# Patient Record
Sex: MALE | Race: WHITE | NOT HISPANIC OR LATINO | Employment: OTHER | URBAN - METROPOLITAN AREA
[De-identification: names, ages, dates, MRNs, and addresses within clinical notes are randomized per-mention and may not be internally consistent; named-entity substitution may affect disease eponyms.]

---

## 2020-01-14 ENCOUNTER — TRANSFERRED RECORDS (OUTPATIENT)
Dept: HEALTH INFORMATION MANAGEMENT | Facility: CLINIC | Age: 70
End: 2020-01-14

## 2020-07-29 ENCOUNTER — TRANSFERRED RECORDS (OUTPATIENT)
Dept: HEALTH INFORMATION MANAGEMENT | Facility: CLINIC | Age: 70
End: 2020-07-29

## 2020-08-06 ENCOUNTER — TRANSFERRED RECORDS (OUTPATIENT)
Dept: HEALTH INFORMATION MANAGEMENT | Facility: CLINIC | Age: 70
End: 2020-08-06

## 2020-08-13 ENCOUNTER — ANCILLARY PROCEDURE (OUTPATIENT)
Dept: CT IMAGING | Facility: CLINIC | Age: 70
End: 2020-08-13
Attending: NURSE PRACTITIONER
Payer: COMMERCIAL

## 2020-08-13 DIAGNOSIS — C61 PROSTATE CANCER (H): ICD-10-CM

## 2020-08-13 LAB
CREAT BLD-MCNC: 1 MG/DL (ref 0.66–1.25)
GFR SERPL CREATININE-BSD FRML MDRD: 74 ML/MIN/{1.73_M2}

## 2020-08-13 PROCEDURE — 74177 CT ABD & PELVIS W/CONTRAST: CPT | Performed by: RADIOLOGY

## 2020-08-13 RX ORDER — IOPAMIDOL 755 MG/ML
100 INJECTION, SOLUTION INTRAVASCULAR ONCE
Status: COMPLETED | OUTPATIENT
Start: 2020-08-13 | End: 2020-08-13

## 2020-08-13 RX ADMIN — IOPAMIDOL 97 ML: 755 INJECTION, SOLUTION INTRAVASCULAR at 14:56

## 2020-08-14 ENCOUNTER — ANCILLARY PROCEDURE (OUTPATIENT)
Dept: GENERAL RADIOLOGY | Facility: CLINIC | Age: 70
End: 2020-08-14
Attending: NURSE PRACTITIONER
Payer: COMMERCIAL

## 2020-08-14 ENCOUNTER — ANCILLARY PROCEDURE (OUTPATIENT)
Dept: NUCLEAR MEDICINE | Facility: CLINIC | Age: 70
End: 2020-08-14
Attending: NURSE PRACTITIONER
Payer: COMMERCIAL

## 2020-08-14 DIAGNOSIS — Z01.89 ENCOUNTER FOR OTHER SPECIFIED SPECIAL EXAMINATIONS: ICD-10-CM

## 2020-08-14 DIAGNOSIS — D07.5 CARCINOMA IN SITU OF PROSTATE: ICD-10-CM

## 2020-08-14 PROCEDURE — A9503 TC99M MEDRONATE: HCPCS

## 2020-08-14 PROCEDURE — 78306 BONE IMAGING WHOLE BODY: CPT

## 2020-08-14 PROCEDURE — 71046 X-RAY EXAM CHEST 2 VIEWS: CPT | Performed by: RADIOLOGY

## 2020-08-14 RX ORDER — TC 99M MEDRONATE 20 MG/10ML
20-30 INJECTION, POWDER, LYOPHILIZED, FOR SOLUTION INTRAVENOUS ONCE
Status: COMPLETED | OUTPATIENT
Start: 2020-08-14 | End: 2020-08-14

## 2020-08-14 RX ADMIN — TC 99M MEDRONATE 26.2 MCI.: 20 INJECTION, POWDER, LYOPHILIZED, FOR SOLUTION INTRAVENOUS at 09:30

## 2020-08-19 ENCOUNTER — TRANSFERRED RECORDS (OUTPATIENT)
Dept: HEALTH INFORMATION MANAGEMENT | Facility: CLINIC | Age: 70
End: 2020-08-19

## 2020-09-15 ENCOUNTER — VIRTUAL VISIT (OUTPATIENT)
Dept: RADIATION THERAPY | Facility: OUTPATIENT CENTER | Age: 70
End: 2020-09-15
Payer: COMMERCIAL

## 2020-09-15 DIAGNOSIS — C61 PROSTATE CANCER (H): Primary | ICD-10-CM

## 2020-09-15 NOTE — LETTER
9/15/2020         RE: Jasiel Rowland  [address]  [address]  [facility] [address]        Dear Colleague,    Thank you for referring your patient, Jasiel Rowland, to the [facility]. Please see a copy of my visit note below.       Department of Radiation Oncology  Radiation Therapy Center  [facility]  [address], [address]  [place] [address]  [phone]       Consultation Note    Name: Jasiel Rowland MRN: [id number]   : 1950   Date of Service: 9/15/2020  [facility]     Reason for consultation: Adenocarcinoma of the prostate, high risk, Medardo score 4+4 = 8, clinical T1 cN0 M0, PSA= 7.1.  Evaluate potential role of radiation therapy.    History of Present Illness   Mr. Rowland is a 70 year old male with a diagnosis of adenocarcinoma of the prostate, high risk, Medardo score 4+4 = 8, clinical T1 cN0 M0, PSA= 7.1.  Evaluate potential role of radiation therapy.    The patient underwent a PSA on 2020 which was elevated 7.1.  He eventually underwent prostate biopsy on 2020.  Pathology demonstrated prostate adenocarcinoma. one core on the right side was present with Medardo score 4+4 = 8 and one core on the right side demonstrated Model score 3+4 = 7.  Patient underwent CT scan of the abdomen pelvis on 2020 which was negative for pelvic lymphadenopathy.  Bone scan on 2020 was negative for metastatic osseous disease.  Patient will meet with Dr. Kaba next week to discuss surgical options.  Patient presents today to discuss potential role of radiation therapy as a part of treatment strategy.    He is overall doing well.  He denies significant urinary obstructive symptoms.  No prior radiation.  No history inflammatory bowel disease.  No pacemaker.  Up-to-date on colonoscopy.  ?  History of MI 10 years ago.  No diabetes. No stroke history.    Past Medical History:   No past medical history on file.    Past Surgical History:   No past  surgical history on file.    Chemotherapy History:  None    Radiation History:  None    Pregnant: Not Applicable  Implanted Cardiac Devices: No    Medications:  Current Outpatient Medications   Medication     ALBUTEROL SULFATE  (90 BASE) MCG/ACT IN AERS     aspirin 81 MG tablet     budesonide-formoterol (SYMBICORT) 160-4.5 MCG/ACT inhaler     Cyanocobalamin (VITAMIN B 12 PO)     hydrocortisone 2.5 % cream     IBUPROFEN PO     RANITIDINE HCL PO     SILDENAFIL CITRATE PO     No current facility-administered medications for this visit.          Allergies:   No Known Allergies      Family History:  No family history on file.    Review of Systems   A 10-point review of systems was performed. Pertinent findings are noted in the HPI.      Imaging/Path/Labs   Imagin/13/20: CTAP negative      20: Bone scan      Path:         Labs:   2020: PSA = 7.1    Assessment    Mr. Rowland is a 70 year old male with a diagnosis of adenocarcinoma of the prostate, high risk, [place] score 4+4 = 8, clinical T1 cN0 M0, PSA= 7.1.  Evaluate potential role of radiation therapy.    Plan     1.  I would like to have the pathology of the patient's prostate biopsy reviewed as the patient has only a single core of Medardo 8 disease in context of single core of low volume [place] 3+4=7 disease. I will also order MRI to clarify local extent of disease.    2. In the case the patient otherwise as high risk prostate cancer, we discussed the natural history of high risk prostate cancer.      3. I discussed prognosis and rationale for treatment of patients with high risk prostate cancer.  Given high risk disease, observation is only appropriate in patients with limited performance status.  Definitive options include prostatectomy and radiation therapy with hormonal therapy.  I discussed that both radiation therapy and androgen deprivation therapy are necessary.  Multiple randomized studies have demonstrated improved survival with the  additional of long term androgen deprivation therapy to radiation therapy, including RTOG 9202 and EORTC 45732 study.  On the RTOG 9202 study, there was a 13% absolute improvement in overall survival (45% vs 32%) at 10 years for the GS 8-10 subset.  In the EORTC study, at 5 years follow up, longer hormonal therapy resulted in a 40% reduction in risk of death.  Additionally, two large scale randomized studies, the SPCG7 and [facility] PR3 have demonstrated improve disease specific survival and overall survival with the addition of prostate radiation to androgen deprivation therapy alone.  In both trials, the addition of radiation led to an approximately 50% improvement in prostate cancer specific mortality at 6-10 years, increasing from 23.9% to 11.9% (absolute different of 12%) at 10 years on the SPCG7 trial.       4. We discussed radiation side effects of treatment including fatigue, urinary obstructive symptoms, loose stool, proctitis, cystitis, and erectile dysfunction.      5.  We discussed that we would recommend hypofractionated (70.2 Gy with SIB to nodes to 46 Gy in 26 fractions) RT vs conventional fractionation. I discussed I would likely include the prostate gland and the pelvic ozzie basins. We discussed treatment with long bhavana term -ADT (1.5 - 3 years) as well.     6. We discussed consideration of Space OAR prior to initiation of RT.      7. The patient will meet with Dr. Kaba next week to discuss surgical options.     8. We will have the patient RTC in 2 weeks after pathology review and MRI to discuss next steps in management.     Due to the concerns around COVID-19 and adhering to social distancing we conduct this visit over the telephone. Telephone call lasted 45 minutes.       Ky Martínez MD  Department of Radiation Oncology  [facility]

## 2020-09-15 NOTE — NURSING NOTE
"Jasiel Rowland is a 70 year old male who is being evaluated via a billable telephone visit.      The patient has been notified of following:     \"This telephone visit will be conducted via a call between you and your physician/provider. We have found that certain health care needs can be provided without the need for a physical exam.  This service lets us provide the care you need with a short phone conversation.  If a prescription is necessary we can send it directly to your pharmacy.  If lab work is needed we can place an order for that and you can then stop by our lab to have the test done at a later time.    Telephone visits are billed at different rates depending on your insurance coverage. During this emergency period, for some insurers they may be billed the same as an in-person visit.  Please reach out to your insurance provider with any questions.    If during the course of the call the physician/provider feels a telephone visit is not appropriate, you will not be charged for this service.\"    Patient has given verbal consent for Telephone visit?  Yes    What phone number would you like to be contacted at? cell    How would you like to obtain your AVS? Mail a copy    Phone call duration: 25 minutes RN time    Flora Ramirez RN    REASON FOR APPOINTMENT   Type of Cancer: prostate cancer  Date of Symptom Onset: rising PSA level - following at [facility]. Bx completed 7/29/20 was positive for prostate cancer    TREATMENT TO-DATE FOR THIS CANCER  Surgery ? Will talk with Dr. Kaba next week 9/22/20 to discuss   Chemotherapy ? Not offered   Other Treatments for this Cancer ? Radiation, eligard (?), spaceOAR    PERSONAL HISTORY OF CANCER   Previous Cancer ? no   Prior Radiation ? no   Prior Chemotherapy ? no   Prior Hormonal Therapy ? no     RECENT IMAGING STUDIES  CT scan 8/13/20 - [facility]  bone scan  8/13/20-[facility]    REFERRALS NEEDED  None at this time  (SpaceOAR with Dr. Areavlo pending patient's decision and " MRI scan)    VITALS  There were no vitals taken for this visit.    PACEMAKER/IMPLANTED CARDIAC DEVICE no    PAIN  Denies    PSYCHOSOCIAL  Marital Status:   Patient lives in [place]  with wife, Kaylee.  Number of children: 4  Working status: retired  Do you feel safe in your home? Yes    REVIEW OF SYSTEMS  Skin: negative  Eyes: glasses  Ears/Nose/Throat: hearing loss  Respiratory: No shortness of breath, dyspnea on exertion, cough, or hemoptysis  Cardiovascular: negative  Gastrointestinal: negative  Genitourinary: negative  Musculoskeletal: arthritis  Neurologic: negative  Psychiatric: negative  Hematologic/Lymphatic/Immunologic: negative  Endocrine: negative    Radiation Oncology Patient Teaching    Current Concern: patient will meet with URology surgeon, Dr. Kaba on 9/22/20. Today talked through the option of radiation (spaceOAR, Eligard) as an option for his newly diagnosed prostate cancer.    Person involved with teaching: Patient and Wife  Patient asked Questions: Yes  Patient was cooperative: Yes  Patient was receptive (willing to accept information given): Yes    Education Assessment  Comprehension ability: Medium  Knowledge level: Medium  Factors affecting teaching: None    Education Materials Given  None today due to phone consult    Educational Topics Discussed  Side effects, Medications, Activity, Nutrition, Adjustment to illness and When to call MD/RN    Response To Teaching  More review necessary  Do you have an advanced directive or living will? yes  Are you DNR/DNI? no

## 2020-09-15 NOTE — PROGRESS NOTES
Department of Radiation Oncology  Radiation Therapy Center  [facility]  [address], [address]  [place] [address]  [phone]       Consultation Note    Name: Jasiel Rowland MRN: [id number]   : 1950   Date of Service: 9/15/2020  [facility]     Reason for consultation: Adenocarcinoma of the prostate, high risk, [place] score 4+4 = 8, clinical T1 cN0 M0, PSA= 7.1.  Evaluate potential role of radiation therapy.    History of Present Illness   Mr. Rowland is a 70 year old male with a diagnosis of adenocarcinoma of the prostate, high risk, Medardo score 4+4 = 8, clinical T1 cN0 M0, PSA= 7.1.  Evaluate potential role of radiation therapy.    The patient underwent a PSA on 2020 which was elevated 7.1.  He eventually underwent prostate biopsy on 2020.  Pathology demonstrated prostate adenocarcinoma. one core on the right side was present with [place] score 4+4 = 8 and one core on the right side demonstrated Medardo score 3+4 = 7.  Patient underwent CT scan of the abdomen pelvis on 2020 which was negative for pelvic lymphadenopathy.  Bone scan on 2020 was negative for metastatic osseous disease.  Patient will meet with Dr. Kaba next week to discuss surgical options.  Patient presents today to discuss potential role of radiation therapy as a part of treatment strategy.    He is overall doing well.  He denies significant urinary obstructive symptoms.  No prior radiation.  No history inflammatory bowel disease.  No pacemaker.  Up-to-date on colonoscopy.  ?  History of MI 10 years ago.  No diabetes. No stroke history.    Past Medical History:   No past medical history on file.    Past Surgical History:   No past surgical history on file.    Chemotherapy History:  None    Radiation History:  None    Pregnant: Not Applicable  Implanted Cardiac Devices: No    Medications:  Current Outpatient Medications   Medication     ALBUTEROL SULFATE  (90 BASE) MCG/ACT  IN AERS     aspirin 81 MG tablet     budesonide-formoterol (SYMBICORT) 160-4.5 MCG/ACT inhaler     Cyanocobalamin (VITAMIN B 12 PO)     hydrocortisone 2.5 % cream     IBUPROFEN PO     RANITIDINE HCL PO     SILDENAFIL CITRATE PO     No current facility-administered medications for this visit.          Allergies:   No Known Allergies      Family History:  No family history on file.    Review of Systems   A 10-point review of systems was performed. Pertinent findings are noted in the HPI.      Imaging/Path/Labs   Imagin/13/20: CTAP negative      20: Bone scan      Path:         Labs:   2020: PSA = 7.1    Assessment    Mr. Rowland is a 70 year old male with a diagnosis of adenocarcinoma of the prostate, high risk, Rap[place] score 4+4 = 8, clinical T1 cN0 M0, PSA= 7.1.  Evaluate potential role of radiation therapy.    Plan     1.  I would like to have the pathology of the patient's prostate biopsy reviewed as the patient has only a single core of Rapidan 8 disease in context of single core of low volume Rapidan 3+4=7 disease. I will also order MRI to clarify local extent of disease.    2. In the case the patient otherwise as high risk prostate cancer, we discussed the natural history of high risk prostate cancer.      3. I discussed prognosis and rationale for treatment of patients with high risk prostate cancer.  Given high risk disease, observation is only appropriate in patients with limited performance status.  Definitive options include prostatectomy and radiation therapy with hormonal therapy.  I discussed that both radiation therapy and androgen deprivation therapy are necessary.  Multiple randomized studies have demonstrated improved survival with the additional of long term androgen deprivation therapy to radiation therapy, including RTOG 9202 and EORTC 42535 study.  On the RTOG 9202 study, there was a 13% absolute improvement in overall survival (45% vs 32%) at 10 years for the GS 8-10 subset.  In  the EORTC study, at 5 years follow up, longer hormonal therapy resulted in a 40% reduction in risk of death.  Additionally, two large scale randomized studies, the SPCG7 and [facility] PR3 have demonstrated improve disease specific survival and overall survival with the addition of prostate radiation to androgen deprivation therapy alone.  In both trials, the addition of radiation led to an approximately 50% improvement in prostate cancer specific mortality at 6-10 years, increasing from 23.9% to 11.9% (absolute different of 12%) at 10 years on the SPCG7 trial.       4. We discussed radiation side effects of treatment including fatigue, urinary obstructive symptoms, loose stool, proctitis, cystitis, and erectile dysfunction.      5.  We discussed that we would recommend hypofractionated (70.2 Gy with SIB to nodes to 46 Gy in 26 fractions) RT vs conventional fractionation. I discussed I would likely include the prostate gland and the pelvic ozzie basins. We discussed treatment with long bhavana term -ADT (1.5 - 3 years) as well.     6. We discussed consideration of Space OAR prior to initiation of RT.      7. The patient will meet with Dr. Kaba next week to discuss surgical options.     8. We will have the patient RTC in 2 weeks after pathology review and MRI to discuss next steps in management.     Due to the concerns around COVID-19 and adhering to social distancing we conduct this visit over the telephone. Telephone call lasted 45 minutes.       Ky Martínez MD  Department of Radiation Oncology  [facility]

## 2020-09-17 PROCEDURE — 00000346 ZZHCL STATISTIC REVIEW OUTSIDE SLIDES TC 88321: Performed by: RADIOLOGY

## 2020-09-18 ENCOUNTER — HOSPITAL ENCOUNTER (OUTPATIENT)
Dept: MRI IMAGING | Facility: CLINIC | Age: 70
Discharge: HOME | End: 2020-09-18
Attending: RADIOLOGY | Admitting: RADIOLOGY
Payer: COMMERCIAL

## 2020-09-18 DIAGNOSIS — C61 PROSTATE CANCER (H): ICD-10-CM

## 2020-09-18 LAB
CREAT BLD-MCNC: 1 MG/DL (ref 0.66–1.25)
GFR SERPL CREATININE-BSD FRML MDRD: 74 ML/MIN/{1.73_M2}

## 2020-09-18 PROCEDURE — 25500064 ZZH RX 255 OP 636: Performed by: RADIOLOGY

## 2020-09-18 PROCEDURE — 72197 MRI PELVIS W/O & W/DYE: CPT

## 2020-09-18 PROCEDURE — 82565 ASSAY OF CREATININE: CPT

## 2020-09-18 PROCEDURE — A9585 GADOBUTROL INJECTION: HCPCS | Performed by: RADIOLOGY

## 2020-09-18 RX ORDER — GADOBUTROL 604.72 MG/ML
10 INJECTION INTRAVENOUS ONCE
Status: COMPLETED | OUTPATIENT
Start: 2020-09-18 | End: 2020-09-18

## 2020-09-18 RX ADMIN — GADOBUTROL 7 ML: 604.72 INJECTION INTRAVENOUS at 18:33

## 2020-09-19 LAB — COPATH REPORT: NORMAL

## 2020-09-21 ENCOUNTER — TELEPHONE (OUTPATIENT)
Facility: CLINIC | Age: 70
End: 2020-09-21

## 2020-09-21 NOTE — TELEPHONE ENCOUNTER
Called imaging to have MRI from 9/18/20 pushed to [facility]. Pt has consult with urology there today.

## 2020-09-22 ENCOUNTER — TELEPHONE (OUTPATIENT)
Dept: RADIATION THERAPY | Facility: OUTPATIENT CENTER | Age: 70
End: 2020-09-22

## 2020-09-22 NOTE — TELEPHONE ENCOUNTER
Pt met with urology yesterday. He will be having surgery/prostatectomy. He called to cancel follow up in radiation on 10/6/20 he was previously trying to decide between treatment options.  MD updated. Pt can be prn in radiation.

## 2020-12-07 ENCOUNTER — TRANSFERRED RECORDS (OUTPATIENT)
Dept: HEALTH INFORMATION MANAGEMENT | Facility: CLINIC | Age: 70
End: 2020-12-07

## 2021-06-17 ENCOUNTER — TRANSFERRED RECORDS (OUTPATIENT)
Dept: HEALTH INFORMATION MANAGEMENT | Facility: CLINIC | Age: 71
End: 2021-06-17

## 2021-07-21 ENCOUNTER — TRANSCRIBE ORDERS (OUTPATIENT)
Dept: OTHER | Age: 71
End: 2021-07-21

## 2021-07-21 DIAGNOSIS — M48.061 LUMBAR SPINAL STENOSIS: Primary | ICD-10-CM

## 2021-07-22 ENCOUNTER — TELEPHONE (OUTPATIENT)
Dept: PALLIATIVE MEDICINE | Facility: CLINIC | Age: 71
End: 2021-07-22

## 2021-07-22 NOTE — TELEPHONE ENCOUNTER
Screening Questions for Radiology Injections:    Injection to be done at which interventional clinic site? [facility]    If [facility] location, tell patient that this procedure requires a COVID-19 lab test be done within 4 days of the procedure. Would you still like to move forward with scheduling the procedure?  Not Applicable   If YES, let patient know that someone will call them to schedule the COVID-19 test and that they will only receive a call back if the result is positive. Route to nursing to enter order.     Instruct patient to arrive as directed prior to the scheduled appointment time:    [place] minutes before      [place]: 30 minutes before; if IV needed 1 hour before     [facility] by [facility]    Procedure ordered? LESI      Transforaminal Cervical LIN - no pain provider currently performing    As a reminder, receiving steroids can decrease your body's ability to fight infection.   Would you still like to move forward with scheduling the injection?  Yes    What insurance would patient like us to bill for this procedure? [facility]/ [facility]      Worker's comp or MVA (motor vehicle accident) -Any injection DO NOT SCHEDULE and route to Queta Ramos.      [employer] insurance - For SI joint injections, DO NOT SCHEDULE and route Queta Ramos.       ALL BCBS, [employer] and [employer]-Route to Queta for review DO NOT SCHEDULE      IF SCHEDULING IN [place] AND NEEDS A PA, IT IS OKAY TO SCHEDULE. [place] HANDLES THEIR OWN PA'S AFTER THE PATIENT IS SCHEDULED. PLEASE SCHEDULE AT LEAST 1 WEEK OUT SO A PA CAN BE OBTAINED.    Any chance of pregnancy? Not Applicable   If YES, do NOT schedule and route to RN pool    Is an  needed? No     Patient has a drive home? (mandatory) YES: INFORMED    Is patient taking any blood thinners (i.e. plavix, coumadin, jantoven, warfarin, heparin, pradaxa or dabigatran, etc)? No   If hold needed, do NOT schedule, route to RN pool      Is patient taking any aspirin products (includes Excedrin and Fiorinal)? No     If more than 325mg/day, OK to schedule; Instruct pt to decrease to less than 325 mg for 7 days AND route to RN pool    For CERVICAL procedures, hold all aspirin products for 6 days.     Tell pt that if aspirin product is not held for 6 days, the procedure WILL BE cancelled.      Does the patient have a bleeding or clotting disorder? No     If YES, okay to schedule AND route to RN nurse pool    For any patients with platelet count <100, must be forwarded to provider    Any allergies to contrast dye, iodine, shellfish, or numbing and steroid medications? No    If YES, add allergy information to appointment notes AND route to the RN pool     If LIN and Contrast Dye / Iodine Allergy? DO NOT SCHEDULE, route to RN pool    Allergies: Patient has no known allergies.     Is patient diabetic?  No  If YES, instruct them to bring their glucometer.    Does patient have an active infection or treated for one within the past week? No     Is patient currently taking any antibiotics?  No     For patients on chronic, preventative, or prophylactic antibiotics, procedures may be scheduled.     For patients on antibiotics for active or recent infection:antibiotic course must have been completed for 4 days    Is patient currently taking any steroid medications? (i.e. Prednisone, Medrol)  No     For patients on steroid medications, course must have been completed for 4 days    Is patient actively being treated for cancer or immunocompromised? No  If YES, do NOT schedule and route to RN pool     Are you able to get on and off an exam table with minimal or no assistance? Yes  If NO, do NOT schedule and route to RN pool    Are you able to roll over and lay on your stomach with minimal or no assistance? Yes  If NO, do NOT schedule and route to RN pool     Has the patient had a flu shot or any other vaccinations within 7 days before or after the procedure.  No      Have you recently had a COVID vaccine or have plans to get it in the near future? No    If yes, explain that for the vaccine to work best they need to:       wait 1 week before and 1 week after getting Vaccine #1    wait 1 week before and 2 weeks after getting Vaccine #2    If patient has concerns about the timing, send to RN pool     Does patient have an MRI/CT?  YES:   Check Procedure Scheduling Grid to see if required.      Was the MRI done within the last 3 years?  Yes    If yes, where was the MRI done i.e.[facility], [facility], [facility], [facility] etc? [facility]      If no, do not schedule and route to RN pool    If MRI was not done at [facility], [facility] or [facility] do NOT schedule and route to RN pool.      If pt has an imaging disc, the injection MAY be scheduled but pt has to bring disc to appt.     If they show up without the disc the injection cannot be done    Procedure Specific Instructions:      If celiac plexus block, informed patient NPO for 6 hours and that it is okay to take medications with sips of water, especially blood pressure medications  Not Applicable         If this is for a cervical procedure, informed patient that aspirin needs to be held for 6 days.   Not Applicable      If IV needed:    Do not schedule procedures requiring IV placement in the first appointment of the day or first appointment after lunch. Do NOT schedule at 0745, 0815 or 1245.     Instructed pt to arrive 30 minutes early for IV start if required. (Check Procedure Scheduling Grid)  Not Applicable    Reminders:      If you are started on any steroids or antibiotics between now and your appointment, you must contact us because the procedure may need to be cancelled.  Yes      For all procedures except radiofrequency ablations (RFAs) and spinal cord stimulator (SCS) trials, informed patient:    IV sedation is not provided for this procedure.  If you feel that an oral anti-anxiety medication is needed, you can discuss  this further with your referring provider or primary care provider.  The Pain Clinic provider will discuss specifics of what the procedure includes at your appointment.  Most procedures last 10-20 minutes.  We use numbing medications to help with any discomfort during the procedure.  Not Applicable      For patients 85 or older we recommend having an adult stay w/ them for the remainder of the day.       Does the patient have any questions?  NO  Jennifer Powers  [facility]

## 2021-07-23 NOTE — TELEPHONE ENCOUNTER
[facility] calling to see if patient is schedule yet. Informed we needed imaging before we could proceed. They stated they would proactively mail a copy of the MRI to [facility] in case we can't find imaging in PACS.    Jennifer FARLEY    [facility]

## 2021-07-28 NOTE — TELEPHONE ENCOUNTER
Pt scheduled 7/30 in [place]. Imaging disc was received in the mail at [place] site.    Jennifer FARLEY    [facility]

## 2021-07-28 NOTE — TELEPHONE ENCOUNTER
Received imaging and letter from [facility]. Placed in un argent bin by the MA desk.    Cesar Jha MA

## 2021-07-30 ENCOUNTER — RADIOLOGY INJECTION OFFICE VISIT (OUTPATIENT)
Dept: PALLIATIVE MEDICINE | Facility: CLINIC | Age: 71
End: 2021-07-30
Payer: COMMERCIAL

## 2021-07-30 VITALS
HEART RATE: 61 BPM | SYSTOLIC BLOOD PRESSURE: 136 MMHG | OXYGEN SATURATION: 98 % | DIASTOLIC BLOOD PRESSURE: 88 MMHG | RESPIRATION RATE: 16 BRPM

## 2021-07-30 DIAGNOSIS — M54.16 LUMBAR RADICULOPATHY: ICD-10-CM

## 2021-07-30 DIAGNOSIS — M48.061 LUMBAR SPINAL STENOSIS: ICD-10-CM

## 2021-07-30 PROCEDURE — 64484 NJX AA&/STRD TFRM EPI L/S EA: CPT | Mod: RT | Performed by: PAIN MEDICINE

## 2021-07-30 PROCEDURE — 64483 NJX AA&/STRD TFRM EPI L/S 1: CPT | Mod: RT | Performed by: PAIN MEDICINE

## 2021-07-30 RX ORDER — DEXAMETHASONE SODIUM PHOSPHATE 10 MG/ML
10 INJECTION INTRAMUSCULAR; INTRAVENOUS ONCE
Status: COMPLETED | OUTPATIENT
Start: 2021-07-30 | End: 2021-07-30

## 2021-07-30 RX ORDER — NAPROXEN 500 MG/1
TABLET ORAL
COMMUNITY
Start: 2021-06-02

## 2021-07-30 RX ADMIN — DEXAMETHASONE SODIUM PHOSPHATE 10 MG: 10 INJECTION INTRAMUSCULAR; INTRAVENOUS at 11:02

## 2021-07-30 ASSESSMENT — PAIN SCALES - GENERAL: PAINLEVEL: MODERATE PAIN (4)

## 2021-07-30 NOTE — PATIENT INSTRUCTIONS
[facility]   Procedure Discharge Instructions    Today you saw:    Dr. John Lui      You had an:  Lumbar Epidural steroid injection       Medications used:  Lidocaine   Bupivacaine   Dexamethasone Omnipaque                If you were holding your blood thinning medication, please restart taking it: N/A    Be cautious when walking. Numbness and/or weakness in the lower extremities may occur for up to 6-8 hours after the procedure due to effect of the local anesthetic    Do not drive for 6 hours. The effect of the local anesthetic could slow your reflexes.     You may resume your regular activities after 24 hours    Avoid strenuous activity for the first 24 hours    You may shower, however avoid swimming, tub baths or hot tubs for 24 hours following your procedure    You may have a mild to moderate increase in pain for several days following the injection.    It may take up to 14 days for the steroid medication to start working although you may feel the effect as early as a few days after the procedure.       You may use ice packs for 10-15 minutes, 3 to 4 times a day at the injection site for comfort    Do not use heat to painful areas for 6 to 8 hours. This will give the local anesthetic time to wear off and prevent you from accidentally burning your skin.     Unless you have been directed to avoid the use of anti-inflammatory medications (NSAIDS), you may use medications such as ibuprofen, Aleve or Tylenol for pain control if needed.     Possible side effects of steroids that you may experience include flushing, elevated blood pressure, increased appetite, mild headaches and restlessness.  All of these symptoms will get better with time.    If you experience any of the following, call the Pain Clinic during work hours (Mon-Friday 8-4:30 pm) at [phone] or the Provider Line after hours at [phone]:  -Fever over 100 degree F  -Swelling, bleeding, redness, drainage, warmth  at the injection site  -Progressive weakness or numbness in your legs   -Loss of bowel or bladder function  -Unusual new onset of pain that is not improving

## 2021-07-30 NOTE — NURSING NOTE
Discharge Information    IV Discontiued Time:  NA    Amount of Fluid Infused:  NA    Discharge Criteria = When patient returns to baseline or as per MD order    Consciousness:  Pt is fully awake    Circulation:  BP +/- 20% of pre-procedure level    Respiration:  Patient is able to breathe deeply    O2 Sat:  Patient is able to maintain O2 Sat >92% on room air    Activity:  Moves 4 extremities on command    Ambulation:  Patient is able to stand and walk or stand and pivot into wheelchair    Dressing:  Clean/dry or No Dressing    Notes:   Discharge instructions and AVS given to patient    Patient meets criteria for discharge?  YES    Admitted to PCU?  No    Responsible adult present to accompany patient home?  Yes    Signature/Title:    Chadd Powers RN  RN Care Coordinator  [facility] [place]

## 2021-07-30 NOTE — PROGRESS NOTES
Pre procedure Diagnosis: lumbar radiculopathy, lumbar degenerative disc disease   Post procedure Diagnosis: Same  Procedure performed: lumbar transforaminal epidural steroid injection at right L3-4 4-5, fluoroscopically guided, contrast controlled  Anesthesia: none  Complications: none  Operators: John Lui MD     Indications:   Jasiel Rowland is a 71 year old male.  They have a history of right-sided low back pain radiating to his anterior thigh.  Exam shows + slump and they have tried conservative treatment including meds/pt.    MRI severe right L3-4, 4-5 foraminal stenosis on the right side  Options/alternatives, benefits and risks were discussed with the patient including bleeding, infection, tissue trauma, numbness, weakness, paralysis, spinal cord injury, radiation exposure, headache and reaction to medications. Questions were answered to his satisfaction and he agrees to proceed. Voluntary informed consent was obtained and signed.     Vitals were reviewed: Yes  BP (!) 144/85   Pulse 67   Allergies were reviewed:  Yes   Medications were reviewed:  Yes   Pre-procedure pain score: 7/10    Procedure:  After getting informed consent, patient was brought into the procedure suite and was placed in a prone position on the procedure table.   A Pause for the Cause was performed.  Patient was prepped and draped in sterile fashion.     After identifying the right L3-4, 4-5 neuroforamen, the C-arm was rotated to a right lateral oblique angle.  A total of 4ml of Lidocaine 1% was used to anesthetize the skin and the needle track at a skin entry site coaxial with the fluoroscopy beam, and overriding the superior aspect of the neuroforamen.  A 22 gauge 3.5 inch spinal needle was advanced under intermittent fluoroscopy until it entered the foramen superiorly.    The position was then inspected from anteroposterior and lateral views, and the needle adjusted appropriately.  A total of 1ml of Omnipaque-300 was injected,  confirming appropriate position, with spread into the nerve root sheath and the epidural space, with no intravascular uptake. 9ml was wasted    Then, after repeated negative aspiration, a combination of Decadron 10 mg, 0.25% bupivacaine 2 ml, diluted with 3ml of normal saline was injected.     Hemostasis was achieved, the area was cleaned, and bandaids were placed when appropriate.  The patient tolerated the procedure well, and was taken to the recovery room.    Images were saved to PACS.    Post-procedure pain score: 0/10  Follow-up includes:   -f/u phone call in one week  -f/u with referring provider    John Lui MD  [facility] [place]

## 2022-01-06 ENCOUNTER — TRANSCRIBE ORDERS (OUTPATIENT)
Dept: OTHER | Age: 72
End: 2022-01-06

## 2022-01-06 DIAGNOSIS — M48.061 SPINAL STENOSIS, LUMBAR REGION, WITHOUT NEUROGENIC CLAUDICATION: Primary | ICD-10-CM

## 2022-01-26 ENCOUNTER — TELEPHONE (OUTPATIENT)
Dept: PALLIATIVE MEDICINE | Facility: CLINIC | Age: 72
End: 2022-01-26
Payer: MEDICARE

## 2022-01-26 NOTE — TELEPHONE ENCOUNTER
Screening Questions for Radiology Injections:    Injection to be done at which interventional clinic site? [facility]    Remind [place] Pt's that Covid test is no longer required except for sedation procedure Pt's.    Instruct patient to arrive as directed prior to the scheduled appointment time:    [facility] before      [place]: 30 minutes before; if IV needed 1 hour before     [facility] by [facility]    Procedure ordered? LESI      Transforaminal Cervical LIN -  [place] does NOT have providers that do these- [facility] and [facility] do have providers that do    As a reminder, receiving steroids can decrease your body's ability to fight infection.   Would you still like to move forward with scheduling the injection?  Yes    What insurance would patient like us to bill for this procedure? [facility]      Worker's comp or MVA (motor vehicle accident) -Any injection DO NOT SCHEDULE and route to Queta Ramos.      [facility] - For SI joint injections, DO NOT SCHEDULE and route Queta Ramos.       ALL BCBS, [employer] and [employer]-Route to Queta for review DO NOT SCHEDULE      IF SCHEDULING IN [place] AND NEEDS A PA, IT IS OKAY TO SCHEDULE. [place] HANDLES THEIR OWN PA'S AFTER THE PATIENT IS SCHEDULED. PLEASE SCHEDULE AT LEAST 1 WEEK OUT SO A PA CAN BE OBTAINED.    Any chance of pregnancy? Not Applicable   If YES, do NOT schedule and route to RN pool    Is an  needed? No     Patient has a drive home? (mandatory) YES: INFORMED    Is patient taking any blood thinners (That is: Coumadin, Warfarin, Jantoven, Pradaxa Xarelto, Eliquis, Edoxaban, Enoxaparin, Lovenox, Heparin, Arixtra, Fondaparinux, or Fragmin? OR Antiplatelet medication such as Plavix, Brilinta, or Effient? )? No   If hold needed, do NOT schedule, route to RN pool     Is patient taking any aspirin products (includes Excedrin and Fiorinal)? No     If more than 325mg/day, OK to schedule; Instruct pt to decrease  to less than 325 mg for 7 days AND route to RN pool    For CERVICAL procedures, hold all aspirin products for 6 days.     Tell pt that if aspirin product is not held for 6 days, the procedure WILL BE cancelled.      Does the patient have a bleeding or clotting disorder? No     If YES, okay to schedule AND route to RN nurse pool    For any patients with platelet count <100, must be forwarded to provider    Any allergies to contrast dye, iodine, shellfish, or numbing and steroid medications? No    If YES, add allergy information to appointment notes AND route to the RN pool     If LIN and Contrast Dye / Iodine Allergy? DO NOT SCHEDULE, route to RN pool    Allergies: Patient has no known allergies.     Is patient diabetic?  No  If YES, instruct them to bring their glucometer.    Does patient have an active infection or treated for one within the past week? No     Is patient currently taking any antibiotics?  No     For patients on chronic, preventative, or prophylactic antibiotics, procedures may be scheduled.     For patients on antibiotics for active or recent infection:antibiotic course must have been completed for 4 days    Is patient currently taking any steroid medications? (i.e. Prednisone, Medrol)  No     For patients on steroid medications, course must have been completed for 4 days    Is patient actively being treated for cancer or immunocompromised? No  If YES, do NOT schedule and route to RN pool     Are you able to get on and off an exam table with minimal or no assistance? Yes  If NO, do NOT schedule and route to RN pool    Are you able to roll over and lay on your stomach with minimal or no assistance? Yes  If NO, do NOT schedule and route to RN pool     Has the patient had a flu shot or any other vaccinations within 7 days before or after the procedure.  No     Have you recently had a COVID vaccine or have plans to get it in the near future? No    If yes, explain that for the vaccine to work best they  need to:       wait 1 week before and 1 week after getting Vaccine #1    wait 1 week before and 2 weeks after getting Vaccine #2    wait 1 week before and 2 weeks after getting Vaccine BOOSTER    If patient has concerns about the timing, send to RN pool     Does patient have an MRI/CT?  YES: 2021  Check Procedure Scheduling Grid to see if required.      Was the MRI done within the last 3 years?  Yes    If yes, where was the MRI done i.e.[facility], [facility], [facility], [facility] etc? [facility]      If no, do not schedule and route to RN pool    If MRI was not done at [facility], [facility] or [facility] do NOT schedule and route to RN pool.      If pt has an imaging disc, the injection MAY be scheduled but pt has to bring disc to appt.     If they show up without the disc the injection cannot be done    Procedure Specific Instructions:      If celiac plexus block, informed patient NPO for 6 hours and that it is okay to take medications with sips of water, especially blood pressure medications  Not Applicable         If this is for a cervical procedure, informed patient that aspirin needs to be held for 6 days.   Not Applicable      If IV needed:    Do not schedule procedures requiring IV placement in the first appointment of the day or first appointment after lunch. Do NOT schedule at 0745, 0815 or 1245.     Instructed pt to arrive 30 minutes early for IV start if required. (Check Procedure Scheduling Grid)  Not Applicable    Reminders:      If you are started on any steroids or antibiotics between now and your appointment, you must contact us because the procedure may need to be cancelled.  Yes      For all procedures except radiofrequency ablations (RFAs) and spinal cord stimulator (SCS) trials, informed patient:    IV sedation is not provided for this procedure.  If you feel that an oral anti-anxiety medication is needed, you can discuss this further with your referring provider or primary care provider.  The Pain  Clinic provider will discuss specifics of what the procedure includes at your appointment.  Most procedures last 10-20 minutes.  We use numbing medications to help with any discomfort during the procedure.  Not Applicable      For patients 85 or older we recommend having an adult stay w/ them for the remainder of the day.       Does the patient have any questions?  NO  Jennifer Powers  [facility]

## 2022-01-27 ENCOUNTER — RADIOLOGY INJECTION OFFICE VISIT (OUTPATIENT)
Dept: PALLIATIVE MEDICINE | Facility: CLINIC | Age: 72
End: 2022-01-27
Payer: COMMERCIAL

## 2022-01-27 VITALS — SYSTOLIC BLOOD PRESSURE: 143 MMHG | DIASTOLIC BLOOD PRESSURE: 79 MMHG | OXYGEN SATURATION: 99 % | HEART RATE: 61 BPM

## 2022-01-27 DIAGNOSIS — M48.061 SPINAL STENOSIS, LUMBAR REGION, WITHOUT NEUROGENIC CLAUDICATION: ICD-10-CM

## 2022-01-27 DIAGNOSIS — M54.16 LUMBAR RADICULOPATHY: ICD-10-CM

## 2022-01-27 PROCEDURE — 62323 NJX INTERLAMINAR LMBR/SAC: CPT | Performed by: PAIN MEDICINE

## 2022-01-27 RX ORDER — TRIAMCINOLONE ACETONIDE 40 MG/ML
40 INJECTION, SUSPENSION INTRA-ARTICULAR; INTRAMUSCULAR ONCE
Status: COMPLETED | OUTPATIENT
Start: 2022-01-27 | End: 2022-01-27

## 2022-01-27 RX ORDER — ROSUVASTATIN CALCIUM 40 MG/1
40 TABLET, COATED ORAL DAILY
COMMUNITY
Start: 2021-12-20

## 2022-01-27 RX ADMIN — TRIAMCINOLONE ACETONIDE 40 MG: 40 INJECTION, SUSPENSION INTRA-ARTICULAR; INTRAMUSCULAR at 13:05

## 2022-01-27 ASSESSMENT — PAIN SCALES - GENERAL: PAINLEVEL: MILD PAIN (3)

## 2022-01-27 NOTE — NURSING NOTE
Pre-procedure Intake  If YES to any questions or NO to having a   Please complete laminated checklist and leave on the computer keyboard for Provider, verbally inform provider if able.    For SCS Trial, RFA's or any sedation procedure:  Have you been fasting? NA    If yes, for how long?     Are you taking any any blood thinners such as Coumadin, Warfarin, Jantoven, Pradaxa Xarelto, Eliquis, Edoxaban, Enoxaparin, Lovenox, Heparin, Arixtra, Fondaparinux, or Fragmin? OR Antiplatelet medication such as Plavix, Brilinta, or Effient?   No     If yes, when did you take your last dose?    Do you take aspirin?  No    If cervical procedure, have you held aspirin for 6 days?       Do you have any allergies to contrast dye, iodine, steroid and/or numbing medications?  NO    Are you currently taking antibiotics or have an active infection?  NO    Have you had a fever/elevated temperature within the past week? NO    Are you currently taking oral steroids? NO    Do you have a ? Yes    Are you pregnant or breastfeeding?  Not Applicable    Have you received the COVID-19 vaccine? No     If yes, was it your 1st, 2nd or only dose needed?     Date of most recent vaccine:     Notify provider and RNs if systolic BP >170, diastolic BP >100, P >100 or O2 sats < 90%

## 2022-01-27 NOTE — NURSING NOTE
Discharge Information    IV Discontiued Time:  NA    Amount of Fluid Infused:  NA    Discharge Criteria = When patient returns to baseline or as per MD order    Consciousness:  Pt is fully awake    Circulation:  BP +/- 20% of pre-procedure level    Respiration:  Patient is able to breathe deeply    O2 Sat:  Patient is able to maintain O2 Sat >92% on room air    Activity:  Moves 4 extremities on command    Ambulation:  Patient is able to stand and walk or stand and pivot into wheelchair    Dressing:  Clean/dry or No Dressing    Notes:   Discharge instructions and AVS given to patient    Patient meets criteria for discharge?  YES    Admitted to PCU?  No    Responsible adult present to accompany patient home?  Yes    Signature/Title:    leighann orozco RN  RN Care Coordinator  [facility] [place]

## 2022-01-27 NOTE — PROGRESS NOTES
Pre procedure Diagnosis: Lumbar radiculopathy  Post procedure Diagnosis: Same  Procedure performed: Right L4-5 interlaminar epidural steroid injection   Anesthesia:  none  Complications: None  Operators: John Lui MD     Indications:   Jasiel Rowland is a 71 year old male.  The patient has a history of right-sided low back pain radiating to his anterior leg.  Examination shows positive slump.  he has tried conservative treatment including meds/PT/prior injections with benefit.    MRI reviewed   Options s/alternatives, benefits and risks were discussed with the patient including but not limited to bleeding, infection, no pain relief, tissue trauma, exposure to radiation, reaction to medications, spinal cord injury, dural puncture, weakness, numbness and headache.  Questions were answered to his satisfaction and he wishes to proceed. Voluntary informed consent was obtained and signed.     Vitals were reviewed: Yes  Allergies were reviewed:  Yes   Medications were reviewed:  Yes   Pre-procedure pain score: 3/10    Procedure:  The patient's medical history, medications, and allergies were reviewed and reconciled.  After obtaining signed informed consent, the patient was brought into the procedure suite and was placed in a prone position on the procedure table.   A Pause for the Cause was performed.  Patient was prepped and draped in the usual sterile fashion.     The L 4-5 interspace was identified with AP fluoroscopy.  A total of 1ml of 1% lidocaine was used to anesthetize the skin and subcutaneous tissues for a  midline approach.    A 20gauge 3.5inch Touhy needle was advanced utilizing intermittent AP and Lateral fluoroscopy and air for loss of resistance.  The epidural space was encountered on the first pass without difficulties.  Aspiration for blood and CSF was negative.  Needle position was verified by injecting 1 ml of Omnipaque utilizing real-time fluoroscopy that showed good needle placement and  epidural spread without signs of intravascular or intrathecal uptake.  Omnipaque wasted:  9 ml.    Then, after repeated negative aspiration for blood or CSF, a combination of Kenalog 40 mg, Bupivicaine 0.25% 2 ml, diluted with 3 ml of normal saline to a total injectate volume of 6 ml was injected into the epidural space in a slow and incremental fashion and the needle was restyletted and withdrawn.  All injected medications were preservative free.    The injection site was cleaned and a sterile dressing was applied.    The patient tolerated the procedure well without complications and was taken to the recovery room for continued observation.    Images were saved to PACS.    Post-procedure pain score: 3/10  Follow-up includes:   -f/u phone call in one week  -f/u with referring provider     John Lui MD  [facility] [place]

## 2022-01-27 NOTE — PATIENT INSTRUCTIONS
[facility]   Procedure Discharge Instructions    Today you saw:  Dr. John Lui      You had an:  Epidural steroid injection   -lumbar       Be cautious when walking. Numbness and/or weakness in the lower extremities may occur for up to 6-8 hours after the procedure due to effect of the local anesthetic    Do not drive for 6 hours. The effect of the local anesthetic could slow your reflexes.     You may resume your regular activities after 24 hours    Avoid strenuous activity for the first 24 hours    You may shower, however avoid swimming, tub baths or hot tubs for 24 hours following your procedure    You may have a mild to moderate increase in pain for several days following the injection.    It may take up to 14 days for the steroid medication to start working although you may feel the effect as early as a few days after the procedure.       You may use ice packs for 10-15 minutes, 3 to 4 times a day at the injection site for comfort    Do not use heat to painful areas for 6 to 8 hours. This will give the local anesthetic time to wear off and prevent you from accidentally burning your skin.     Unless you have been directed to avoid the use of anti-inflammatory medications (NSAIDS), you may use medications such as ibuprofen, Aleve or Tylenol for pain control if needed.     If you were fasting, you may resume your normal diet and medications after the procedure    If you have diabetes, check your blood sugar more frequently than usual as your blood sugar may be higher than normal for 10-14 days following a steroid injection. Contact your doctor who manages your diabetes if your blood sugar is higher than usual    Possible side effects of steroids that you may experience include flushing, elevated blood pressure, increased appetite, mild headaches and restlessness.  All of these symptoms will get better with time.    If you experience any of the following, call the Pain Clinic  during work hours (Mon-Friday 8-4:30 pm) at [phone] or the Provider Line after hours at [phone]:  -Fever over 100 degree F  -Swelling, bleeding, redness, drainage, warmth at the injection site  -Progressive weakness or numbness in your legs or arms  -Loss of bowel or bladder function  -Unusual headache that is not relieved by Tylenol or other pain reliever  -Unusual new onset of pain that is not improving

## 2024-09-21 ENCOUNTER — HOSPITAL ENCOUNTER (EMERGENCY)
Facility: CLINIC | Age: 74
Discharge: HOME | End: 2024-09-21
Attending: STUDENT IN AN ORGANIZED HEALTH CARE EDUCATION/TRAINING PROGRAM | Admitting: STUDENT IN AN ORGANIZED HEALTH CARE EDUCATION/TRAINING PROGRAM
Payer: COMMERCIAL

## 2024-09-21 VITALS
HEART RATE: 74 BPM | RESPIRATION RATE: 18 BRPM | DIASTOLIC BLOOD PRESSURE: 122 MMHG | TEMPERATURE: 97.7 F | SYSTOLIC BLOOD PRESSURE: 184 MMHG | OXYGEN SATURATION: 98 %

## 2024-09-21 DIAGNOSIS — A49.8 CLOSTRIDIOIDES DIFFICILE INFECTION: ICD-10-CM

## 2024-09-21 PROBLEM — R12 HEARTBURN: Status: ACTIVE | Noted: 2020-11-19

## 2024-09-21 PROBLEM — Z90.79 HISTORY OF ROBOT-ASSISTED LAPAROSCOPIC RADICAL PROSTATECTOMY: Status: ACTIVE | Noted: 2020-12-07

## 2024-09-21 PROBLEM — H11.009 PTERYGIUM OF EYE: Status: ACTIVE | Noted: 2020-11-19

## 2024-09-21 PROBLEM — M48.061 SPINAL STENOSIS OF LUMBAR REGION: Status: ACTIVE | Noted: 2024-09-21

## 2024-09-21 PROBLEM — Z85.46 PERSONAL HISTORY OF MALIGNANT NEOPLASM OF PROSTATE: Status: ACTIVE | Noted: 2020-12-07

## 2024-09-21 PROBLEM — C61 PRIMARY MALIGNANT NEOPLASM OF PROSTATE (H): Status: ACTIVE | Noted: 2024-09-21

## 2024-09-21 PROBLEM — H35.60 RETINAL HEMORRHAGE: Status: ACTIVE | Noted: 2020-11-19

## 2024-09-21 PROBLEM — C61 CARCINOMA OF PROSTATE (H): Status: ACTIVE | Noted: 2024-09-21

## 2024-09-21 PROBLEM — N52.9 ERECTILE DYSFUNCTION: Status: ACTIVE | Noted: 2024-09-21

## 2024-09-21 PROBLEM — G47.30 SLEEP APNEA: Status: ACTIVE | Noted: 2020-11-19

## 2024-09-21 PROBLEM — D12.6 BENIGN NEOPLASM OF COLON: Status: ACTIVE | Noted: 2024-09-21

## 2024-09-21 PROBLEM — E78.5 HYPERLIPIDEMIA: Status: ACTIVE | Noted: 2024-09-21

## 2024-09-21 PROBLEM — J45.909 ASTHMA: Status: ACTIVE | Noted: 2020-11-19

## 2024-09-21 PROBLEM — Z86.0101 HISTORY OF ADENOMATOUS POLYP OF COLON: Status: ACTIVE | Noted: 2020-11-19

## 2024-09-21 PROBLEM — M75.100 ROTATOR CUFF SYNDROME: Status: ACTIVE | Noted: 2020-11-19

## 2024-09-21 PROBLEM — R97.20 RAISED PROSTATE SPECIFIC ANTIGEN: Status: ACTIVE | Noted: 2024-09-21

## 2024-09-21 PROBLEM — N39.3 MALE URINARY STRESS INCONTINENCE: Status: ACTIVE | Noted: 2021-01-20

## 2024-09-21 PROCEDURE — 99283 EMERGENCY DEPT VISIT LOW MDM: CPT | Performed by: STUDENT IN AN ORGANIZED HEALTH CARE EDUCATION/TRAINING PROGRAM

## 2024-09-21 PROCEDURE — 99284 EMERGENCY DEPT VISIT MOD MDM: CPT | Performed by: STUDENT IN AN ORGANIZED HEALTH CARE EDUCATION/TRAINING PROGRAM

## 2024-09-21 RX ORDER — ACETAMINOPHEN 500 MG
1000 TABLET ORAL 3 TIMES DAILY PRN
COMMUNITY
Start: 2023-10-11

## 2024-09-21 RX ORDER — FLUTICASONE PROPIONATE AND SALMETEROL 250; 50 UG/1; UG/1
1 POWDER RESPIRATORY (INHALATION) 2 TIMES DAILY
COMMUNITY
Start: 2023-10-11

## 2024-09-21 RX ORDER — VANCOMYCIN HYDROCHLORIDE 125 MG/1
125 CAPSULE ORAL 4 TIMES DAILY
Qty: 56 CAPSULE | Refills: 0 | Status: SHIPPED | OUTPATIENT
Start: 2024-09-21 | End: 2024-10-05

## 2024-09-21 ASSESSMENT — ACTIVITIES OF DAILY LIVING (ADL): ADLS_ACUITY_SCORE: 35

## 2024-09-21 ASSESSMENT — COLUMBIA-SUICIDE SEVERITY RATING SCALE - C-SSRS
6. HAVE YOU EVER DONE ANYTHING, STARTED TO DO ANYTHING, OR PREPARED TO DO ANYTHING TO END YOUR LIFE?: NO
1. IN THE PAST MONTH, HAVE YOU WISHED YOU WERE DEAD OR WISHED YOU COULD GO TO SLEEP AND NOT WAKE UP?: NO
2. HAVE YOU ACTUALLY HAD ANY THOUGHTS OF KILLING YOURSELF IN THE PAST MONTH?: NO

## 2024-09-21 NOTE — ED PROVIDER NOTES
History     Chief Complaint   Patient presents with    Diarrhea     HPI  Jasiel Rowland is a 74 year old male who department requesting treatment for C. difficile infection.  Patient explains that he has had intermittent diarrhea for nearly 1 year, although better today.  Yesterday he was seen by his primary provider through [facility] and reportedly provided a stool specimen, received a call this evening that his specimen was positive for C. difficile and was instructed to go to the local emergency department for evaluation of possible dehydration and initiation of antibiotic therapy.  In the department patient denies fever, chills, dizziness, lightheadedness, chest pain, cough, shortness of breath, abdominal pain, cramping, nausea/vomiting, constipation or diarrhea.  No active symptoms.        Allergies:  No Known Allergies    Problem List:    Patient Active Problem List    Diagnosis Date Noted    Benign neoplasm of colon 09/21/2024     Priority: Medium     Aug 08, 2012 Entered By: LONA GREY Comment: 8-8-12 c scope with rectal polyp removed.      Carcinoma of prostate (H) 09/21/2024     Priority: Medium    Primary malignant neoplasm of prostate (H) 09/21/2024     Priority: Medium    Erectile dysfunction 09/21/2024     Priority: Medium    Hyperlipidemia 09/21/2024     Priority: Medium    Raised prostate specific antigen 09/21/2024     Priority: Medium    Spinal stenosis of lumbar region 09/21/2024     Priority: Medium     Dec 16, 2021 Entered By: MAIRA PLEITEZ Comment: L3-4 and L4-5 severe stenosis on right per MRI from June 202      Male urinary stress incontinence 01/20/2021     Priority: Medium    History of robot-assisted laparoscopic radical prostatectomy 12/07/2020     Priority: Medium    Personal history of malignant neoplasm of prostate 12/07/2020     Priority: Medium    Asthma 11/19/2020     Priority: Medium     Formatting of this note might be different from the original.   Mar 21,  2011 Entered By: FRANCISCO GRANDA Comment: pfts 3/11: borderline copd  Mar 21, 2011 Entered By: FRANCISCO GRANDA Comment: pfts 3/11: borderline copd      Sleep apnea 11/19/2020     Priority: Medium     Formatting of this note might be different from the original.   Jan 07, 2020 Entered By: MAIRA PLEITEZ Comment: Dx 1/7/2019 with AHI of 17.3  Jan 07, 2020 Entered By: MAIRA PLEITEZ Comment: Dx 1/7/2019 with AHI of 17.3      Heartburn 11/19/2020     Priority: Medium     Formatting of this note might be different from the original.   Sep 26, 2012 Entered By: RYAN QUIÑONEZ Comment: EGD normal w/exception of small hiatus hernia Sep 2012  Sep 26, 2012 Entered By: RYAN QUIÑONEZ Comment: EGD normal w/exception of small hiatus hernia Sep 2012      History of adenomatous polyp of colon 11/19/2020     Priority: Medium     Formatting of this note might be different from the original.   Aug 25, 2017 Entered By: BALJEET KUHN Comment: colonoscopy 8/17  Aug 25, 2017 Entered By: BALJEET KUHN Comment: colonoscopy 8/17      Pterygium of eye 11/19/2020     Priority: Medium    Retinal hemorrhage 11/19/2020     Priority: Medium    Rotator cuff syndrome 11/19/2020     Priority: Medium     Formatting of this note might be different from the original.   Mar 11, 2011 Entered By: FRANCISCO GRANDA Comment: bilateral          Past Medical History:    No past medical history on file.    Past Surgical History:    No past surgical history on file.    Family History:    No family history on file.    Social History:  Marital Status:   [2]  Social History     Tobacco Use    Smoking status: Never   Substance Use Topics    Alcohol use: No    Drug use: No        Medications:    acetaminophen (TYLENOL) 500 MG tablet  fluticasone-salmeterol (ADVAIR) 250-50 MCG/ACT inhaler  vancomycin (VANCOCIN) 125 MG capsule  ALBUTEROL SULFATE  (90 BASE) MCG/ACT IN AERS  aspirin 81 MG tablet  budesonide-formoterol (SYMBICORT) 160-4.5 MCG/ACT  inhaler  Cyanocobalamin (VITAMIN B 12 PO)  naproxen (NAPROSYN) 500 MG tablet  rosuvastatin (CRESTOR) 40 MG tablet          Review of Systems   ROS: 10 point ROS neg other than the symptoms noted above in the HPI.    Physical Exam   Pulse: 74  Resp: 18  SpO2: 98 %      Physical Exam  Constitutional:  Well developed, well nourished.  Appears nontoxic and in no acute distress.  Resting comfortably on the gurney.  HENT:  Normocephalic and atraumatic.  Symmetric in appearance.  Eyes:  Conjunctivae are normal.  Cardiovascular:  No cyanosis.  RRR.    Respiratory:  Effort normal without sign of respiratory distress.  No audible wheezing or stridor.  CTAB.   Gastrointestinal:  Soft nondistended abdomen.  Nontender and without guarding.  No rigidity or rebound tenderness.  Negative Carpenter's sign.  Negative McBurney's point.    Musculoskeletal:  Moves extremities spontaneously.  Neurological:  Patient is alert.  Skin:  Skin is warm and dry.  Psychiatric:  Normal mood and affect.      ED Course        Procedures              Critical Care time:  none               No results found for this or any previous visit (from the past 24 hour(s)).    Medications - No data to display    Assessments & Plan (with Medical Decision Making)   Jasiel Rowland is a 74 year old male who presents to the department reportedly at the urging of [facility] for [facility] after stool culture returned positive for C. difficile infection.  Patient arrived to the department afebrile and hemodynamically stable with benign abdominal examination.  He denies gastrointestinal symptoms over the past 24 hours, although has had chronic diarrhea for nearly 1 year.  [facility] Kusum Lewis who had previously telephoned the patient, she says that she is unaware of the context and wanted patient to be evaluated for possible dehydration and prescription of oral antibiotic vancomycin.  He is clinically nontoxic in appearance and without  signs of dehydration or low volume state, per provider recommendation will start prescription of oral vancomycin QID with instructions to follow-up with primary provider early this week.  Patient is in agreement with plan.        Disclaimer:  This note consists of symbols derived from keyboarding, dictation, and/or voice recognition software.  As a result, there may be errors in the script that have gone undetected.  Please consider this when interpreting information found in the chart.      I have reviewed the nursing notes.    I have reviewed the findings, diagnosis, plan and need for follow up with the patient.             New Prescriptions    VANCOMYCIN (VANCOCIN) 125 MG CAPSULE    Take 1 capsule (125 mg) by mouth 4 times daily for 14 days.       Final diagnoses:   Clostridioides difficile infection       9/21/2024   [facility] EMERGENCY DEPT       Dhruv Farris DO  09/21/24 [id number]

## 2024-09-21 NOTE — ED TRIAGE NOTES
[facility] by [facility] for a positive C. Diff stool sample. Pt has had over ayear of intermittent diarrhea and has been trying to get into a GIU doc. The [facility] wanted to [facility] and found it to be COVID positive. Pt has had no changes in symptoms.     Triage Assessment (Adult)       Row Name 09/21/24 [id number]          Triage Assessment    Airway WDL WDL        Respiratory WDL    Respiratory WDL WDL        Skin Circulation/Temperature WDL    Skin Circulation/Temperature WDL WDL        Cardiac WDL    Cardiac WDL WDL        Peripheral/Neurovascular WDL    Peripheral Neurovascular WDL WDL        Cognitive/Neuro/Behavioral WDL    Cognitive/Neuro/Behavioral WDL WDL